# Patient Record
Sex: FEMALE
[De-identification: names, ages, dates, MRNs, and addresses within clinical notes are randomized per-mention and may not be internally consistent; named-entity substitution may affect disease eponyms.]

---
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
## 2024-07-08 ENCOUNTER — APPOINTMENT (OUTPATIENT)
Dept: OBGYN | Facility: CLINIC | Age: 19
End: 2024-07-08
Payer: COMMERCIAL

## 2024-07-08 PROCEDURE — 99385 PREV VISIT NEW AGE 18-39: CPT

## 2024-07-08 PROCEDURE — 96127 BRIEF EMOTIONAL/BEHAV ASSMT: CPT

## 2024-07-08 PROCEDURE — 99459 PELVIC EXAMINATION: CPT

## 2024-07-16 PROBLEM — Z00.00 ENCOUNTER FOR PREVENTIVE HEALTH EXAMINATION: Status: ACTIVE | Noted: 2024-07-16

## 2024-07-22 ENCOUNTER — APPOINTMENT (OUTPATIENT)
Dept: OBGYN | Facility: CLINIC | Age: 19
End: 2024-07-22
Payer: COMMERCIAL

## 2024-07-22 PROCEDURE — 36415 COLL VENOUS BLD VENIPUNCTURE: CPT

## 2024-07-22 PROCEDURE — 76830 TRANSVAGINAL US NON-OB: CPT

## 2024-07-22 PROCEDURE — 76856 US EXAM PELVIC COMPLETE: CPT | Mod: 59

## 2024-07-22 PROCEDURE — 99213 OFFICE O/P EST LOW 20 MIN: CPT | Mod: 25

## 2024-07-25 ENCOUNTER — APPOINTMENT (OUTPATIENT)
Dept: OBGYN | Facility: CLINIC | Age: 19
End: 2024-07-25
Payer: COMMERCIAL

## 2024-07-25 ENCOUNTER — TRANSCRIPTION ENCOUNTER (OUTPATIENT)
Age: 19
End: 2024-07-25

## 2024-07-25 ENCOUNTER — NON-APPOINTMENT (OUTPATIENT)
Age: 19
End: 2024-07-25

## 2024-07-25 VITALS
WEIGHT: 116 LBS | SYSTOLIC BLOOD PRESSURE: 119 MMHG | BODY MASS INDEX: 19.81 KG/M2 | DIASTOLIC BLOOD PRESSURE: 73 MMHG | HEIGHT: 64 IN | HEART RATE: 94 BPM

## 2024-07-25 DIAGNOSIS — R19.00 INTRA-ABDOMINAL AND PELVIC SWELLING, MASS AND LUMP, UNSPECIFIED SITE: ICD-10-CM

## 2024-07-25 DIAGNOSIS — N83.209 UNSPECIFIED OVARIAN CYST, UNSPECIFIED SIDE: ICD-10-CM

## 2024-07-25 PROCEDURE — 99214 OFFICE O/P EST MOD 30 MIN: CPT

## 2024-07-25 RX ORDER — FEXOFENADINE HCL 60 MG
CAPSULE ORAL
Refills: 0 | Status: ACTIVE | COMMUNITY

## 2024-07-25 RX ORDER — CETIRIZINE HYDROCHLORIDE 10 MG/1
TABLET, FILM COATED ORAL
Refills: 0 | Status: ACTIVE | COMMUNITY

## 2024-07-25 RX ORDER — LORATADINE 5 MG/5 ML
SOLUTION, ORAL ORAL
Refills: 0 | Status: ACTIVE | COMMUNITY

## 2024-07-26 ENCOUNTER — APPOINTMENT (OUTPATIENT)
Dept: CT IMAGING | Facility: IMAGING CENTER | Age: 19
End: 2024-07-26
Payer: SELF-PAY

## 2024-07-26 ENCOUNTER — RESULT REVIEW (OUTPATIENT)
Age: 19
End: 2024-07-26

## 2024-07-26 LAB
AFP-TM SERPL-MCNC: <1.8 NG/ML
HCG-TM SERPL-MCNC: <1 MIU/ML
LDH SERPL-CCNC: 186 U/L

## 2024-07-26 PROCEDURE — 74177 CT ABD & PELVIS W/CONTRAST: CPT | Mod: 26

## 2024-07-29 ENCOUNTER — OUTPATIENT (OUTPATIENT)
Dept: OUTPATIENT SERVICES | Facility: HOSPITAL | Age: 19
LOS: 1 days | End: 2024-07-29
Payer: COMMERCIAL

## 2024-07-29 VITALS
RESPIRATION RATE: 16 BRPM | TEMPERATURE: 99 F | OXYGEN SATURATION: 99 % | SYSTOLIC BLOOD PRESSURE: 104 MMHG | HEART RATE: 80 BPM | DIASTOLIC BLOOD PRESSURE: 73 MMHG | HEIGHT: 64 IN | WEIGHT: 117.07 LBS

## 2024-07-29 DIAGNOSIS — R19.00 INTRA-ABDOMINAL AND PELVIC SWELLING, MASS AND LUMP, UNSPECIFIED SITE: ICD-10-CM

## 2024-07-29 LAB
ANION GAP SERPL CALC-SCNC: 13 MMOL/L — SIGNIFICANT CHANGE UP (ref 5–17)
BUN SERPL-MCNC: 8 MG/DL — SIGNIFICANT CHANGE UP (ref 7–23)
CALCIUM SERPL-MCNC: 9.7 MG/DL — SIGNIFICANT CHANGE UP (ref 8.4–10.5)
CHLORIDE SERPL-SCNC: 103 MMOL/L — SIGNIFICANT CHANGE UP (ref 96–108)
CO2 SERPL-SCNC: 23 MMOL/L — SIGNIFICANT CHANGE UP (ref 22–31)
CREAT SERPL-MCNC: 0.46 MG/DL — LOW (ref 0.5–1.3)
EGFR: 141 ML/MIN/1.73M2 — SIGNIFICANT CHANGE UP
GLUCOSE SERPL-MCNC: 102 MG/DL — HIGH (ref 70–99)
HCT VFR BLD CALC: 37 % — SIGNIFICANT CHANGE UP (ref 34.5–45)
HGB BLD-MCNC: 12.7 G/DL — SIGNIFICANT CHANGE UP (ref 11.5–15.5)
MCHC RBC-ENTMCNC: 29.8 PG — SIGNIFICANT CHANGE UP (ref 27–34)
MCHC RBC-ENTMCNC: 34.3 GM/DL — SIGNIFICANT CHANGE UP (ref 32–36)
MCV RBC AUTO: 86.9 FL — SIGNIFICANT CHANGE UP (ref 80–100)
NRBC # BLD: 0 /100 WBCS — SIGNIFICANT CHANGE UP (ref 0–0)
PLATELET # BLD AUTO: 241 K/UL — SIGNIFICANT CHANGE UP (ref 150–400)
POTASSIUM SERPL-MCNC: 3.5 MMOL/L — SIGNIFICANT CHANGE UP (ref 3.5–5.3)
POTASSIUM SERPL-SCNC: 3.5 MMOL/L — SIGNIFICANT CHANGE UP (ref 3.5–5.3)
RBC # BLD: 4.26 M/UL — SIGNIFICANT CHANGE UP (ref 3.8–5.2)
RBC # FLD: 11.5 % — SIGNIFICANT CHANGE UP (ref 10.3–14.5)
SODIUM SERPL-SCNC: 139 MMOL/L — SIGNIFICANT CHANGE UP (ref 135–145)
WBC # BLD: 4.88 K/UL — SIGNIFICANT CHANGE UP (ref 3.8–10.5)
WBC # FLD AUTO: 4.88 K/UL — SIGNIFICANT CHANGE UP (ref 3.8–10.5)

## 2024-07-29 PROCEDURE — 86901 BLOOD TYPING SEROLOGIC RH(D): CPT

## 2024-07-29 PROCEDURE — 86900 BLOOD TYPING SEROLOGIC ABO: CPT

## 2024-07-29 PROCEDURE — 80048 BASIC METABOLIC PNL TOTAL CA: CPT

## 2024-07-29 PROCEDURE — G0463: CPT

## 2024-07-29 PROCEDURE — 86850 RBC ANTIBODY SCREEN: CPT

## 2024-07-29 PROCEDURE — 85027 COMPLETE CBC AUTOMATED: CPT

## 2024-07-29 RX ORDER — GABAPENTIN 400 MG/1
600 CAPSULE ORAL ONCE
Refills: 0 | Status: COMPLETED | OUTPATIENT
Start: 2024-08-02 | End: 2024-08-02

## 2024-07-29 RX ORDER — CELECOXIB 200 MG/1
400 CAPSULE ORAL ONCE
Refills: 0 | Status: COMPLETED | OUTPATIENT
Start: 2024-08-02 | End: 2024-08-02

## 2024-07-29 RX ORDER — BACTERIOSTATIC SODIUM CHLORIDE 0.9 %
3 VIAL (ML) INJECTION EVERY 8 HOURS
Refills: 0 | Status: DISCONTINUED | OUTPATIENT
Start: 2024-08-02 | End: 2024-08-02

## 2024-07-29 RX ORDER — ACETAMINOPHEN 500 MG
1000 TABLET ORAL ONCE
Refills: 0 | Status: COMPLETED | OUTPATIENT
Start: 2024-08-02 | End: 2024-08-02

## 2024-07-29 RX ORDER — CHLORHEXIDINE GLUCONATE 500 MG/1
1 CLOTH TOPICAL ONCE
Refills: 0 | Status: DISCONTINUED | OUTPATIENT
Start: 2024-08-02 | End: 2024-08-02

## 2024-07-29 NOTE — H&P PST ADULT - ASSESSMENT
Activity: Exercise twice a week, weights,     DASI scale:    Mallampati:    Dentition: Denies loose teeth/retainer Activity: Exercise twice a week, weights, can run, can jog    DASI scale: 9.89    Mallampati: 1    Dentition: Denies loose teeth/dentures, has permanent brace retainer

## 2024-07-29 NOTE — H&P PST ADULT - PROBLEM SELECTOR PLAN 1
Exploratory Laparotomy Large Pelvic Mass on 8/2/24  Pre-op instructions, including Chlorhexidine soap and ERP hydration instrutions provided - all questions answered  CBC, BMP, T&S done at [facility]

## 2024-07-29 NOTE — H&P PST ADULT - HISTORY OF PRESENT ILLNESS
17 yo female, LMP 24, , 5-6 days,   Pt is a 19 y/o G0 pt of Dr. Wolfe's who presents as consultation for ovarian cyst. She had a routine pelvic exam and was noted to have pelvic fullness, then sent for imaging which confirmed the cyst. She reports bloating but no other symptoms. Denies pain, AUB. Reports regular menses. They are not painful. No dietary changes/early satiety. No issues with voiding/defecating. She was on Junel until early , stopped due to mood swings. She is returning to college in September and would like to have surgery prior to then if possible    CT 7/A 19.5 cm large pelvic cystic mass, possibly arising from the left ovary,   as it is not separately visualized. Differential considerations include   mucinous ovarian lesions, or less likely  nongynecologic cystic neoplasia.    peliv mass, gyn stoch distended a humberto sonogram, mass swoollen feeling   17 yo female, PMH idiopathic urticaria being treated by allergist by combo of Claritin, Allegra and Zyrtec, , LMP 24 - reports her menses as regular, lasting about 5-6 days and not painful, recently had GYN exam and pelvic fullness palpated, sonogram followed by CT A/P revealed a 19.5 cm large pelvic cystic mass, possibly arising from the left ovary. Pt. presents to PST for scheduled  Exploratory Laparotomy for Large Pelvic Mass on 24. Denies recent fever, chills, chest pain, SOB, changes in bowel/urinary habits or recent exposure to COVID-19 infection. Pt. denies clotting or bleeding disorders.

## 2024-07-29 NOTE — H&P PST ADULT - ATTENDING COMMENTS
pt with 20 cm probable loc simple with negative markers and ct for expl laparotomy ovarian cystectomy possible bilateral cystectomy possible unilateral adexectomy the risks and alternatives have been discussed.

## 2024-07-30 NOTE — PHYSICAL EXAM
[Chaperone Present] : A chaperone was present in the examining room during all aspects of the physical examination [FreeTextEntry2] : daydry [Appropriately responsive] : appropriately responsive [Alert] : alert [No Acute Distress] : no acute distress [Soft] : soft [Non-tender] : non-tender [FreeTextEntry7] : 18-20 wk mass nontender distended lower abd/pelvis

## 2024-07-30 NOTE — PLAN
[FreeTextEntry1] : Discussed the management of complex ovarian cysts at length.  Risks including but not limited to torsion, rupture and malignancy were discussed.  With findings consistent with probable pathologic ovarian cyst recommend surgical intervention.  Pt to have ct of abd and pelvis and tumor markers sent plan for expl laparotomy and ovarian cystectomy possible adnexectomy. During this visit 40 minutes were spent face-to-face with greater than 50% of the time dedicated to counseling.

## 2024-07-30 NOTE — HISTORY OF PRESENT ILLNESS
[FreeTextEntry1] : Pt is a 17 y/o G0 pt of Dr. Prince who presents as consultation for ovarian cyst. She had a routine pelvic exam and was noted to have pelvic fullness, then sent for imaging which confirmed the cyst. She reports bloating but no other symptoms. Denies pain, AUB. Reports regular menses. They are not painful. No dietary changes/early satiety. No issues with voiding/defecating. She was on Junel until early June, stopped due to mood swings. She is returning to college in September and would like to have surgery prior to then if possible.  US 7/23/24: anteverted uterus, endometrium 10.8mm. Midline adnexal complex cyst with low level echos and echogenic areas measuring 18.1x11.2x16cm. Difficult to fully see boarders due to limited viewpoint on machine. Bilateral ovaries obstructed by adnexal cyst. CA-125 drawn and returned at 25.  PMH: none PSH: none OBHx: G0 GYN: none Meds: Allegra, zyrtec, claritin All: NKDA FH: mom- breast cancer SH: No T/E/D

## 2024-07-31 ENCOUNTER — APPOINTMENT (OUTPATIENT)
Dept: GYNECOLOGIC ONCOLOGY | Facility: CLINIC | Age: 19
End: 2024-07-31

## 2024-08-02 ENCOUNTER — APPOINTMENT (OUTPATIENT)
Dept: OBGYN | Facility: HOSPITAL | Age: 19
End: 2024-08-02

## 2024-08-02 ENCOUNTER — RESULT REVIEW (OUTPATIENT)
Age: 19
End: 2024-08-02

## 2024-08-02 ENCOUNTER — TRANSCRIPTION ENCOUNTER (OUTPATIENT)
Age: 19
End: 2024-08-02

## 2024-08-02 ENCOUNTER — OUTPATIENT (OUTPATIENT)
Dept: INPATIENT UNIT | Facility: HOSPITAL | Age: 19
LOS: 1 days | End: 2024-08-02
Payer: COMMERCIAL

## 2024-08-02 VITALS
HEIGHT: 64 IN | SYSTOLIC BLOOD PRESSURE: 112 MMHG | OXYGEN SATURATION: 99 % | WEIGHT: 117.07 LBS | HEART RATE: 97 BPM | TEMPERATURE: 98 F | DIASTOLIC BLOOD PRESSURE: 74 MMHG | RESPIRATION RATE: 16 BRPM

## 2024-08-02 VITALS
RESPIRATION RATE: 16 BRPM | SYSTOLIC BLOOD PRESSURE: 100 MMHG | DIASTOLIC BLOOD PRESSURE: 53 MMHG | HEART RATE: 81 BPM | OXYGEN SATURATION: 100 %

## 2024-08-02 DIAGNOSIS — R19.00 INTRA-ABDOMINAL AND PELVIC SWELLING, MASS AND LUMP, UNSPECIFIED SITE: ICD-10-CM

## 2024-08-02 LAB — HCG UR QL: NEGATIVE — SIGNIFICANT CHANGE UP

## 2024-08-02 PROCEDURE — C1765: CPT

## 2024-08-02 PROCEDURE — C9399: CPT

## 2024-08-02 PROCEDURE — 58925 REMOVAL OF OVARIAN CYST(S): CPT | Mod: LT

## 2024-08-02 PROCEDURE — 88112 CYTOPATH CELL ENHANCE TECH: CPT

## 2024-08-02 PROCEDURE — 88331 PATH CONSLTJ SURG 1 BLK 1SPC: CPT

## 2024-08-02 PROCEDURE — 81025 URINE PREGNANCY TEST: CPT

## 2024-08-02 PROCEDURE — 88112 CYTOPATH CELL ENHANCE TECH: CPT | Mod: 26

## 2024-08-02 PROCEDURE — 58925 REMOVAL OF OVARIAN CYST(S): CPT

## 2024-08-02 PROCEDURE — 88331 PATH CONSLTJ SURG 1 BLK 1SPC: CPT | Mod: 26

## 2024-08-02 PROCEDURE — 88307 TISSUE EXAM BY PATHOLOGIST: CPT

## 2024-08-02 PROCEDURE — 88307 TISSUE EXAM BY PATHOLOGIST: CPT | Mod: 26

## 2024-08-02 DEVICE — INTERCEED 3 X 4": Type: IMPLANTABLE DEVICE | Status: FUNCTIONAL

## 2024-08-02 RX ORDER — HYDROMORPHONE HCL IN 0.9% NACL 0.2 MG/ML
0.5 PLASTIC BAG, INJECTION (ML) INTRAVENOUS
Refills: 0 | Status: DISCONTINUED | OUTPATIENT
Start: 2024-08-02 | End: 2024-08-02

## 2024-08-02 RX ORDER — OXYCODONE HYDROCHLORIDE 30 MG/1
1 TABLET ORAL
Qty: 3 | Refills: 0
Start: 2024-08-02

## 2024-08-02 RX ORDER — DEXTROSE MONOHYDRATE, SODIUM CHLORIDE, SODIUM LACTATE, CALCIUM CHLORIDE, MAGNESIUM CHLORIDE 1.5; 538; 448; 18.4; 5.08 G/100ML; MG/100ML; MG/100ML; MG/100ML; MG/100ML
1000 SOLUTION INTRAPERITONEAL
Refills: 0 | Status: DISCONTINUED | OUTPATIENT
Start: 2024-08-02 | End: 2024-08-02

## 2024-08-02 RX ORDER — FEXOFENADINE HCL 30 MG/5 ML
1 SUSPENSION, ORAL (FINAL DOSE FORM) ORAL
Refills: 0 | DISCHARGE

## 2024-08-02 RX ORDER — CETIRIZINE HYDROCHLORIDE 10 MG/1
1 TABLET ORAL
Refills: 0 | DISCHARGE

## 2024-08-02 RX ORDER — LORATADINE 10 MG
1 TABLET ORAL
Refills: 0 | DISCHARGE

## 2024-08-02 RX ORDER — ACETAMINOPHEN 500 MG
3 TABLET ORAL
Qty: 0 | Refills: 0 | DISCHARGE

## 2024-08-02 RX ORDER — DEXTROSE MONOHYDRATE, SODIUM CHLORIDE, SODIUM LACTATE, CALCIUM CHLORIDE, MAGNESIUM CHLORIDE 1.5; 538; 448; 18.4; 5.08 G/100ML; MG/100ML; MG/100ML; MG/100ML; MG/100ML
500 SOLUTION INTRAPERITONEAL ONCE
Refills: 0 | Status: ACTIVE | OUTPATIENT
Start: 2024-08-02 | End: 2024-08-02

## 2024-08-02 RX ORDER — CEFOTETAN 2 G/1
2 INJECTION, POWDER, FOR SOLUTION INTRAMUSCULAR; INTRAVENOUS ONCE
Refills: 0 | Status: ACTIVE | OUTPATIENT
Start: 2024-08-02 | End: 2024-08-02

## 2024-08-02 RX ORDER — IBUPROFEN 200 MG
3 TABLET ORAL
Qty: 0 | Refills: 0 | DISCHARGE

## 2024-08-02 RX ORDER — ONDANSETRON HCL/PF 4 MG/2 ML
4 VIAL (ML) INJECTION ONCE
Refills: 0 | Status: DISCONTINUED | OUTPATIENT
Start: 2024-08-02 | End: 2024-08-02

## 2024-08-02 RX ADMIN — GABAPENTIN 600 MILLIGRAM(S): 400 CAPSULE ORAL at 15:12

## 2024-08-02 RX ADMIN — CELECOXIB 400 MILLIGRAM(S): 200 CAPSULE ORAL at 15:12

## 2024-08-02 RX ADMIN — Medication 1000 MILLIGRAM(S): at 15:12

## 2024-08-02 RX ADMIN — DEXTROSE MONOHYDRATE, SODIUM CHLORIDE, SODIUM LACTATE, CALCIUM CHLORIDE, MAGNESIUM CHLORIDE 125 MILLILITER(S): 1.5; 538; 448; 18.4; 5.08 SOLUTION INTRAPERITONEAL at 20:05

## 2024-08-02 RX ADMIN — Medication 3 MILLILITER(S): at 15:15

## 2024-08-02 NOTE — BRIEF OPERATIVE NOTE - OPERATION/FINDINGS
EUA demonstrated abdominal mass palpable to level of umbilicus  On laparotomy, 1.5L brownish fluid drained in a controlled fashion from thin-walled ovarian cyst  Frozen pathology mucinous cystadenoma  Ovary oversewn with 3-0 Vicryl  Uterus, L fallopian tube, R fallopian tube/ovary wnl, appendix wnl EUA demonstrated abdominal mass palpable to level of umbilicus  On mini-laparotomy (3-4cm incision), 1.5L brownish fluid drained in a controlled fashion from thin-walled ovarian cyst  No intraabdominal spillage  Frozen pathology mucinous cystadenoma  Ovary oversewn with 3-0 Vicryl  Uterus, L fallopian tube, R fallopian tube/ovary wnl, appendix wnl

## 2024-08-02 NOTE — CHART NOTE - NSCHARTNOTEFT_GEN_A_CORE
R2 Post Op Check    Patient seen and examined at bedside, recently post-op. Still feeling sleepy. No acute complaints at this time. Denies CP, SOB, N/V, fevers, and chills.    Vital Signs Last 24 Hours  T(C): 36.5 (08-02-24 @ 17:57), Max: 36.5 (08-02-24 @ 12:40)  HR: 76 (08-02-24 @ 19:15) (60 - 97)  BP: 95/55 (08-02-24 @ 19:15) (89/51 - 112/74)  RR: 15 (08-02-24 @ 19:15) (14 - 16)  SpO2: 100% (08-02-24 @ 19:15) (99% - 100%)    I&O's Summary      Physical Exam:  General: NAD  Lungs: unlabored breathing  Abdomen: Soft, appropriately tender, non-distended  Incision: Small suprapubic incision CDI with steri strips  Ext: No pain or swelling    Labs:             12.7   4.88  )-----------( 241      ( 07-29 @ 18:36 )             37.0         MEDICATIONS  (STANDING):  cefoTEtan  IVPB 2 Gram(s) IV Intermittent once  lactated ringers Bolus 500 milliLiter(s) IV Bolus once  lactated ringers. 1000 milliLiter(s) (75 mL/Hr) IV Continuous <Continuous>  lactated ringers. 1000 milliLiter(s) (125 mL/Hr) IV Continuous <Continuous>    MEDICATIONS  (PRN):  HYDROmorphone  Injectable 0.5 milliGRAM(s) IV Push every 10 minutes PRN Moderate Pain (4 - 6)  ondansetron Injectable 4 milliGRAM(s) IV Push once PRN Nausea and/or Vomiting      19 yo s/p open L ovarian cystectomy recovering well in acute post-operative state.    Neuro: Pain controlled. PO pain meds  CV: Hemodynamically stable  Pulm: Encourage oob/ambulation when able  GI: Advance diet as tolerated  : DTV@130a  Heme: SCDs for DVT PPX  ID: afebrile  Endo: no active issues  Dispo: continue routine post-op care. Patient is cleared for discharge when ambulating, voiding, tolerating PO, and pain well controlled    L Panella PGY2  d/w Dr. Bloom

## 2024-08-02 NOTE — ASU DISCHARGE PLAN (ADULT/PEDIATRIC) - CARE PROVIDER_API CALL
Jr Felix  Obstetrics and Gynecology  [address] [place] [address], [address]  [place] [address]  Phone: [phone]  Fax: [phone]  Follow Up Time:

## 2024-08-02 NOTE — ASU DISCHARGE PLAN (ADULT/PEDIATRIC) - NS MD DC FALL RISK RISK
For information on Fall & Injury Prevention, visit: [email or web address] OR  [email or web address] OR  https://www.cdc.gov/steadi/patient.html

## 2024-08-02 NOTE — ASU PATIENT PROFILE, ADULT - PATIENT REPRESENTATIVE PHONE
Quality 131: Pain Assessment And Follow-Up: Pain assessment NOT documented as being performed, documentation the patient is not eligible for a pain assessment using a standardized tool
Detail Level: Detailed
[phone]

## 2024-08-02 NOTE — ASU PATIENT PROFILE, ADULT - FALL HARM RISK - UNIVERSAL INTERVENTIONS
Bed in lowest position, wheels locked, appropriate side rails in place/Call bell, personal items and telephone in reach/Instruct patient to call for assistance before getting out of bed or chair/Non-slip footwear when patient is out of bed/[place] to call system/Physically safe environment - no spills, clutter or unnecessary equipment/Purposeful Proactive Rounding/Room/bathroom lighting operational, light cord in reach

## 2024-08-05 LAB — NON-GYNECOLOGICAL CYTOLOGY STUDY: SIGNIFICANT CHANGE UP

## 2024-08-06 LAB — SURGICAL PATHOLOGY STUDY: SIGNIFICANT CHANGE UP

## 2024-08-15 ENCOUNTER — APPOINTMENT (OUTPATIENT)
Dept: OBGYN | Facility: CLINIC | Age: 19
End: 2024-08-15
Payer: COMMERCIAL

## 2024-08-15 VITALS
BODY MASS INDEX: 19.29 KG/M2 | DIASTOLIC BLOOD PRESSURE: 62 MMHG | HEART RATE: 84 BPM | WEIGHT: 113 LBS | SYSTOLIC BLOOD PRESSURE: 101 MMHG | HEIGHT: 64 IN

## 2024-08-15 PROCEDURE — 99024 POSTOP FOLLOW-UP VISIT: CPT

## 2024-08-17 ENCOUNTER — TRANSCRIPTION ENCOUNTER (OUTPATIENT)
Age: 19
End: 2024-08-17

## 2024-08-17 NOTE — HISTORY OF PRESENT ILLNESS
[Pain is well-controlled] : pain is well-controlled [Fever] : no fever [Chills] : no chills [Nausea] : no nausea [Vomiting] : no vomiting [Clean/Dry/Intact] : clean, dry and intact [Erythema] : not erythematous [Normal] : normal [None] : no vaginal bleeding [Pathology reviewed] : pathology reviewed [de-identified] : Doing well for postop path benign

## 2024-08-19 ENCOUNTER — TRANSCRIPTION ENCOUNTER (OUTPATIENT)
Age: 19
End: 2024-08-19

## 2025-06-19 NOTE — H&P PST ADULT - NSICDXNOPASTSURGICALHX_GEN_ALL_CORE
Patient is compliant with use of cpap, using it the majority of nights.  Benefit from the use of the device is noted.     <-- Click to add NO significant Past Surgical History

## 2025-08-21 ENCOUNTER — APPOINTMENT (OUTPATIENT)
Dept: OBGYN | Facility: CLINIC | Age: 20
End: 2025-08-21

## 2025-08-21 VITALS
HEIGHT: 64 IN | DIASTOLIC BLOOD PRESSURE: 69 MMHG | BODY MASS INDEX: 18.27 KG/M2 | SYSTOLIC BLOOD PRESSURE: 108 MMHG | WEIGHT: 107 LBS

## 2025-08-21 PROCEDURE — 99212 OFFICE O/P EST SF 10 MIN: CPT

## (undated) DEVICE — SOL IRR POUR H2O 250ML

## (undated) DEVICE — SUT POLYSORB 1 36" GS-21 UNDYED

## (undated) DEVICE — DRAPE TOWEL BLUE 17" X 24"

## (undated) DEVICE — SOL IRR POUR NS 0.9% 500ML

## (undated) DEVICE — GLV 6.5 PROTEXIS (WHITE)

## (undated) DEVICE — SPECIMEN CONTAINER 100ML

## (undated) DEVICE — GLV 7.5 PROTEXIS (WHITE)

## (undated) DEVICE — PREP CHLORAPREP HI-LITE ORANGE 26ML

## (undated) DEVICE — ELCTR BOVIE PENCIL SMOKE EVACUATION

## (undated) DEVICE — DRSG STERISTRIPS 0.5 X 4"

## (undated) DEVICE — BLADE SCALPEL SAFETYLOCK #15

## (undated) DEVICE — PACK CYSTO

## (undated) DEVICE — STAPLER SKIN VISI-STAT 35 WIDE

## (undated) DEVICE — WARMING BLANKET UPPER ADULT

## (undated) DEVICE — MARKING PEN W RULER

## (undated) DEVICE — DRAPE CHEST BREAST 106" X 122"

## (undated) DEVICE — BLADE SCALPEL SAFETYLOCK #10

## (undated) DEVICE — Device

## (undated) DEVICE — VENODYNE/SCD SLEEVE CALF MEDIUM

## (undated) DEVICE — GLV 7 PROTEXIS (WHITE)

## (undated) DEVICE — FOLEY TRAY 16FR LF URINE METER SURESTEP

## (undated) DEVICE — SUT QUILL MONODERM 3-0 30CM PS-2

## (undated) DEVICE — PREP CHLOROHEXIDINE 4% 118CC KIT

## (undated) DEVICE — DRAPE MAYO STAND 30"

## (undated) DEVICE — PACK MAJOR ABDOMINAL SUPINE

## (undated) DEVICE — MEDICATION LABELS W MARKER

## (undated) DEVICE — LIGASURE IMPACT

## (undated) DEVICE — SUT POLYSORB 1 18" UNDYED

## (undated) DEVICE — ABDOMINAL BINDER MED/LG 12" X 36"-54"

## (undated) DEVICE — POSITIONER FOAM EGG CRATE ULNAR 2PCS (PINK)

## (undated) DEVICE — DRSG OPSITE 13.75 X 4"

## (undated) DEVICE — SUT POLYSORB 0 30" GS-21 UNDYED

## (undated) DEVICE — SUT POLYSORB 0 18" MP UNDYED

## (undated) DEVICE — DRAPE LIGHT HANDLE COVER (BLUE)